# Patient Record
Sex: FEMALE | Race: WHITE
[De-identification: names, ages, dates, MRNs, and addresses within clinical notes are randomized per-mention and may not be internally consistent; named-entity substitution may affect disease eponyms.]

---
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
## 2023-08-03 NOTE — RADRPT
EXAM DATE/TIME:  02/14/2018 09:27 

 

[facility] COMPARISON:     

No previous studies available for comparison.

 

 

INDICATIONS :     

Dizziness and syncope this morning.

                      

 

RADIATION DOSE:     

56.35 CTDIvol (mGy) 

 

 

 

MEDICAL HISTORY :     

None  

 

SURGICAL HISTORY :      

None. 

 

ENCOUNTER:      

Initial

 

ACUITY:      

1 day

 

PAIN SCALE:      

0/10

 

LOCATION:        

cranial 

 

TECHNIQUE:     

Multiple contiguous axial images were obtained of the head.  Using automated exposure control and adj
ustment of the mA and/or kV according to patient size, radiation dose was kept as low as reasonably a
chievable to obtain optimal diagnostic quality images.   DICOM format image data is available electro
nically for review and comparison.  

 

FINDINGS:     

 

CEREBRUM:     

The ventricles are normal for age.  No evidence of midline shift, mass lesion, hemorrhage or acute in
farction.  No extra-axial fluid collections are seen.

 

POSTERIOR FOSSA:     

The cerebellum and brainstem are intact.  The 4th ventricle is midline.  The cerebellopontine angle i
s unremarkable.

 

EXTRACRANIAL:     

The visualized portion of the orbits is intact.

 

SKULL:     

The calvaria is intact.  No evidence of skull fracture.

 

CONCLUSION:     Negative for acute process.  

 

 

 Neel Hutton MD FACR on February 14, 2018 at 9:30           

Board Certified Radiologist.

 This report was verified electronically.
no

## 2024-06-26 NOTE — PD
HPI


Chief Complaint:  Syncope/Near-Syncope


Time Seen by Provider:  08:45


Travel History


International Travel<30 days:  No


Contact w/Intl Traveler<30days:  No


Traveled to known affect area:  No





History of Present Illness


HPI


18-year-old female patient presents to the ER today, was nauseous yesterday, 

was not eating very much today, and had a syncopal episode.  She states that 

she thinks she bumped her head.  She denies any other injuries, chest pains, 

shortness of breath, abdominal pains, or any other issues.





Modifying Factors: None


Associated Signs & Symptoms: Nausea, syncopal episode


Risk Factors: None





PFSH


Past Medical History


Pregnant?:  Not Pregnant


LMP:  02/08/18





Social History


Tobacco Use:  No





Allergies-Medications


(Allergen,Severity, Reaction):  


Coded Allergies:  


     No Known Allergies (Unverified , 2/14/18)





Review of Systems


Except as stated in HPI:  all other systems reviewed are Neg





Physical Exam


Narrative


GENERAL: Well-developed young female patient currently in no acute distress.  

Awake and oriented 3.


SKIN: Focused skin assessment warm/dry.


HEAD: Atraumatic. Normocephalic. 


EYES: Pupils equal and round. No scleral icterus. No injection or drainage. 


ENT: No nasal bleeding or discharge.  Mucous membranes pink and moist.


NECK: Trachea midline. No JVD.  Supple.


CARDIOVASCULAR: Regular rate and rhythm.  No murmur appreciated.


RESPIRATORY: No accessory muscle use. Clear to auscultation. Breath sounds 

equal bilaterally. 


GASTROINTESTINAL: Abdomen soft, non-tender, nondistended. Hepatic and splenic 

margins not palpable. 


MUSCULOSKELETAL: No obvious deformities. No clubbing.  No cyanosis.  No edema. 


NEUROLOGICAL: Awake and alert. No obvious cranial nerve deficits.  Motor 

grossly within normal limits. Normal speech.


PSYCHIATRIC: Appropriate mood and affect; insight and judgment normal.





Data


Data


Last Documented VS





Vital Signs








  Date Time  Temp Pulse Resp B/P (MAP) Pulse Ox O2 Delivery O2 Flow Rate FiO2


 


2/14/18 08:21 99.2 94 14 124/58 (80) 96   








Orders





 Orders


Electrocardiogram (2/14/18 08:45)


Ed Urine Pregnancytest Poc (2/14/18 08:45)


Complete Blood Count With Diff (2/14/18 08:45)


Comprehensive Metabolic Panel (2/14/18 08:45)


Magnesium (Mg) (2/14/18 08:45)


Urinalysis - C+S If Indicated (2/14/18 08:45)


Ct Brain W/O Iv Contrast(Rout) (2/14/18 08:45)


Ecg Monitoring (2/14/18 08:45)


Iv Access Insert/Monitor (2/14/18 08:45)


Oximetry (2/14/18 08:45)


Potassium Chloride Eff (K-Lyte Cl  Eff) (2/14/18 10:00)


Ed Discharge Order (2/14/18 10:11)





Labs





Laboratory Tests








Test


  2/14/18


08:45 2/14/18


08:52


 


Urine Color YELLOW  


 


Urine Turbidity HAZY  


 


Urine pH 6.0  


 


Urine Specific Gravity 1.019  


 


Urine Protein TRACE mg/dL  


 


Urine Glucose (UA) NEG mg/dL  


 


Urine Ketones NEG mg/dL  


 


Urine Occult Blood NEG  


 


Urine Nitrite NEG  


 


Urine Bilirubin NEG  


 


Urine Urobilinogen


  LESS THAN 2.0


MG/DL 


 


 


Urine Leukocyte Esterase NEG  


 


Urine WBC 1 /hpf  


 


Urine Squamous Epithelial


Cells 3 /hpf 


  


 


 


Urine Bacteria RARE /hpf  


 


Urine Mucus FEW /lpf  


 


Microscopic Urinalysis Comment


  CULT NOT


INDICATED 


 


 


White Blood Count  9.9 TH/MM3 


 


Red Blood Count  4.81 MIL/MM3 


 


Hemoglobin  14.0 GM/DL 


 


Hematocrit  40.2 % 


 


Mean Corpuscular Volume  83.6 FL 


 


Mean Corpuscular Hemoglobin  29.0 PG 


 


Mean Corpuscular Hemoglobin


Concent 


  34.7 % 


 


 


Red Cell Distribution Width  13.3 % 


 


Platelet Count  291 TH/MM3 


 


Mean Platelet Volume  6.9 FL 


 


Neutrophils (%) (Auto)  82.0 % 


 


Lymphocytes (%) (Auto)  8.3 % 


 


Monocytes (%) (Auto)  9.0 % 


 


Eosinophils (%) (Auto)  0.1 % 


 


Basophils (%) (Auto)  0.6 % 


 


Neutrophils # (Auto)  8.1 TH/MM3 


 


Lymphocytes # (Auto)  0.8 TH/MM3 


 


Monocytes # (Auto)  0.9 TH/MM3 


 


Eosinophils # (Auto)  0.0 TH/MM3 


 


Basophils # (Auto)  0.1 TH/MM3 


 


CBC Comment  DIFF FINAL 


 


Differential Comment   


 


Blood Urea Nitrogen  8 MG/DL 


 


Creatinine  0.73 MG/DL 


 


Random Glucose  92 MG/DL 


 


Total Protein  8.5 GM/DL 


 


Albumin  4.3 GM/DL 


 


Calcium Level  9.1 MG/DL 


 


Magnesium Level  2.1 MG/DL 


 


Alkaline Phosphatase  77 U/L 


 


Aspartate Amino Transf


(AST/SGOT) 


  12 U/L 


 


 


Alanine Aminotransferase


(ALT/SGPT) 


  15 U/L 


 


 


Total Bilirubin  0.4 MG/DL 


 


Sodium Level  136 MEQ/L 


 


Potassium Level  3.3 MEQ/L 


 


Chloride Level  104 MEQ/L 


 


Carbon Dioxide Level  24.8 MEQ/L 


 


Anion Gap  7 MEQ/L 











MDM


Medical Decision Making


Medical Screen Exam Complete:  Yes


Emergency Medical Condition:  Yes


Medical Record Reviewed:  Yes


Interpretation(s)





Laboratory Tests








Test


  2/14/18


08:45 2/14/18


08:52


 


Urine Turbidity HAZY (CLEAR)  


 


Urine Bacteria


  RARE /hpf


(NONE) 


 


 


Urine Mucus FEW /lpf (OCC)  


 


Mean Platelet Volume


  


  6.9 FL


(7.0-11.0)


 


Neutrophils (%) (Auto)


  


  82.0 %


(16.0-70.0)


 


Lymphocytes (%) (Auto)


  


  8.3 %


(9.0-44.0)


 


Monocytes (%) (Auto)


  


  9.0 %


(0.0-8.0)


 


Neutrophils # (Auto)


  


  8.1 TH/MM3


(1.8-7.7)


 


Lymphocytes # (Auto)


  


  0.8 TH/MM3


(1.0-4.8)


 


Aspartate Amino Transf


(AST/SGOT) 


  12 U/L (16-38) 


 


 


Potassium Level


  


  3.3 MEQ/L


(3.5-5.1)








Last 24 hours Impressions








Head CT 2/14/18 0845 Signed





Impressions: 





 Service Date/Time:  Wednesday, February 14, 2018 09:27 - CONCLUSION: Negative 





 for acute process.      Neel Hutton MD  FACR








Differential Diagnosis


Vasovagal syncope versus dehydration versus metabolic issues versus 

dysrhythmias versus Pregnancy


Narrative Course


Vital signs are stable in the ER.  EKG did not show significant dysrhythmias, 

delta waves, and patient is not pregnant.  Lab work does show a mildly low 

potassium and p.o. potassium was given in the ER.  CT of the brain did not show 

any signs of acute injuries.  She has no focal neurological deficits.  At this 

point, my plan would be to release her follow-up to primary care physician.  

Return for worsening in symptoms as necessary.  Stay hydrated.  The plan was 

discussed with her and mom and they state understanding.





Diagnosis





 Primary Impression:  


 Syncope


***Med/Other Pt SpecificInfo:  Prescription(s) given


Scripts


Ondansetron Odt (Zofran Odt) 4 Mg Tab


4 MG SL Q6HR Y for Nausea/Vomiting, #3 TAB 0 Refills


   Prov: Masoud Medina MD         2/14/18


Disposition:  01 DISCHARGE HOME


Condition:  Stable











Masoud Medina MD Feb 14, 2018 08:47 Physical Therapy Treatment    Patient Name:  Abi Duran   MRN:  [id number]    Recommendations:     Discharge Recommendations: Low Intensity Therapy  Discharge Equipment Recommendations: none  Barriers to discharge: None    Assessment:     Abi Duran is a 70 y.o. female admitted with a medical diagnosis of Primary osteoarthritis of right knee.  She presents with the following impairments/functional limitations: weakness, impaired endurance, impaired self care skills, impaired functional mobility, gait instability, decreased lower extremity function, pain, decreased ROM, orthopedic precautions . Awake, alert, supine in bed.  Eager to participate in therapy.  Sup > sit with Min A.  Sit > stand with rw and Min A.  Ambulated 200' + 50' with rw and CGA, WBAT RLE. Seated rest due to discomfort .  Performed LE exercises seated .     Rehab Prognosis: Good; patient would benefit from acute skilled PT services to address these deficits and reach maximum level of function.    Recent Surgery: Procedure(s) (LRB):  ROBOTIC ARTHROPLASTY, KNEE, TOTAL (Right) 1 Day Post-Op    Plan:     During this hospitalization, patient to be seen BID to address the identified rehab impairments via gait training, therapeutic activities, therapeutic exercises and progress toward the following goals:    Plan of Care Expires:  06/30/24    Subjective     Chief Complaint: discomfort RLE with activity.   Patient/Family Comments/goals: to return home.   Pain/Comfort:  Pain Rating 1: other (see comments) (did not rate, increased with activity)  Location - Side 1: Right  Location - Orientation 1: generalized  Location 1: knee      Objective:     Communicated with nurse Rubio prior to session.  Patient found supine with bed alarm, PureWick upon PT entry to room.     General Precautions: Standard, fall  Orthopedic Precautions: RLE weight bearing as tolerated  Braces: N/A  Respiratory Status: Room air     Functional Mobility:  Bed Mobility:      Supine to Sit: minimum assistance  Transfers:     Sit to Stand:  minimum assistance with rolling walker  Gait: 200' + 50' with rw and CGA, WBAT RLE.  Seated rest required.       AM-PAC 6 CLICK MOBILITY          Treatment & Education:  Ambulated with rw and assistance for safety.  BLE exercises AAROM RLE :  TKE's, Hip flexion, SLR's, HS, QS , AP's.     Patient left up in chair with all lines intact, call button in reach, chair alarm on, and nurse Joanne notified..    GOALS:   Multidisciplinary Problems       Physical Therapy Goals       Not on file              Multidisciplinary Problems (Resolved)          Problem: Physical Therapy    Goal Priority Disciplines Outcome Goal Variances Interventions   Physical Therapy Goal   (Resolved)     PT, PT/OT Met     Description: Goals to be met by: 2024     Patient will increase functional independence with mobility by performin. Supine to sit with Contact Guard Assistance  2. Sit to stand transfer with Contact Guard Assistance  3. Bed to chair transfer with Contact Guard Assistance using Rolling Walker  4. Gait  x 250 feet with Contact Guard Assistance using Rolling Walker.   5. Lower extremity exercise program x20 reps                        Time Tracking:     PT Received On: 24  PT Start Time: 910     PT Stop Time: 935  PT Total Time (min): 25 min     Billable Minutes: Gait Training 15min and Therapeutic Exercise 10min    Treatment Type: Treatment  PT/PTA: PTA     Number of PTA visits since last PT visit: 2024